# Patient Record
Sex: FEMALE | Race: WHITE | URBAN - METROPOLITAN AREA
[De-identification: names, ages, dates, MRNs, and addresses within clinical notes are randomized per-mention and may not be internally consistent; named-entity substitution may affect disease eponyms.]

---

## 2023-08-31 ENCOUNTER — APPOINTMENT (RX ONLY)
Dept: URBAN - METROPOLITAN AREA CLINIC 341 | Facility: CLINIC | Age: 58
Setting detail: DERMATOLOGY
End: 2023-08-31

## 2023-08-31 DIAGNOSIS — L71.8 OTHER ROSACEA: ICD-10-CM | Status: INADEQUATELY CONTROLLED

## 2023-08-31 PROCEDURE — ? TREATMENT REGIMEN

## 2023-08-31 PROCEDURE — ? ADDITIONAL NOTES

## 2023-08-31 PROCEDURE — ? PRESCRIPTION MEDICATION MANAGEMENT

## 2023-08-31 PROCEDURE — ? PRESCRIPTION

## 2023-08-31 PROCEDURE — ? COUNSELING

## 2023-08-31 PROCEDURE — 99204 OFFICE O/P NEW MOD 45 MIN: CPT

## 2023-08-31 RX ORDER — DOXYCYCLINE HYCLATE 20 MG/1
TABLET, FILM COATED ORAL
Qty: 60 | Refills: 1 | Status: ERX | COMMUNITY
Start: 2023-08-31

## 2023-08-31 RX ADMIN — DOXYCYCLINE HYCLATE: 20 TABLET, FILM COATED ORAL at 00:00

## 2023-08-31 ASSESSMENT — LOCATION ZONE DERM
LOCATION ZONE: EYELID
LOCATION ZONE: FACE
LOCATION ZONE: EYELID

## 2023-08-31 ASSESSMENT — LOCATION SIMPLE DESCRIPTION DERM
LOCATION SIMPLE: RIGHT CHEEK
LOCATION SIMPLE: RIGHT SUPERIOR EYELID
LOCATION SIMPLE: LEFT CHEEK
LOCATION SIMPLE: RIGHT SUPERIOR EYELID

## 2023-08-31 ASSESSMENT — LOCATION DETAILED DESCRIPTION DERM
LOCATION DETAILED: LEFT INFERIOR CENTRAL MALAR CHEEK
LOCATION DETAILED: RIGHT INFERIOR CENTRAL MALAR CHEEK
LOCATION DETAILED: RIGHT LATERAL SUPERIOR EYELID
LOCATION DETAILED: RIGHT LATERAL SUPERIOR EYELID

## 2023-08-31 NOTE — PROCEDURE: PRESCRIPTION MEDICATION MANAGEMENT
Initiate Treatment: doxycycline hyclate 20 mg tablet - Take 1 tablet PO twice daily - one in morning and one in night - with food.
Detail Level: Zone
Render In Strict Bullet Format?: No

## 2023-08-31 NOTE — PROCEDURE: ADDITIONAL NOTES
Additional Notes: Patient has been seen by eye doc for presumed conjunctivitis after eye infection following URI. Patient was on eye abx and oral doxy and improved but then case recurred. Today seems like possible occular rosacea given irritation and grittiness with red lid line. However, pt states has family history of macular degeneration recommended follow up with [facility] if not improved with low dose doxycycline. Additional Notes: Patient has been seen by eye doc for presumed conjunctivitis after eye infection following URI. Patient was on eye abx and oral doxy and improved but then case recurred. Today seems like possible occular rosacea given irritation and grittiness with red lid line. However, pt states has family history of macular degeneration recommended follow up with [facility] if not improved with low dose doxycycline.

## 2024-03-22 ENCOUNTER — NEW PATIENT (OUTPATIENT)
Dept: URBAN - METROPOLITAN AREA CLINIC 22 | Facility: CLINIC | Age: 59
End: 2024-03-22

## 2024-03-22 DIAGNOSIS — H25.13: ICD-10-CM

## 2024-03-22 DIAGNOSIS — H01.023: ICD-10-CM

## 2024-03-22 DIAGNOSIS — H04.123: ICD-10-CM

## 2024-03-22 DIAGNOSIS — H02.886: ICD-10-CM

## 2024-03-22 DIAGNOSIS — H02.883: ICD-10-CM

## 2024-03-22 DIAGNOSIS — L71.9: ICD-10-CM

## 2024-03-22 DIAGNOSIS — E11.9: ICD-10-CM

## 2024-03-22 DIAGNOSIS — H01.026: ICD-10-CM

## 2024-03-22 PROCEDURE — 92004 COMPRE OPH EXAM NEW PT 1/>: CPT

## 2024-03-22 ASSESSMENT — TONOMETRY
OS_IOP_MMHG: 14
OD_IOP_MMHG: 14

## 2024-03-22 ASSESSMENT — VISUAL ACUITY
OS_CC: 20/20
OD_CC: 20/20

## 2024-04-02 ENCOUNTER — FOLLOW UP (OUTPATIENT)
Dept: URBAN - METROPOLITAN AREA CLINIC 22 | Facility: CLINIC | Age: 59
End: 2024-04-02

## 2024-04-02 DIAGNOSIS — H01.023: ICD-10-CM

## 2024-04-02 DIAGNOSIS — H01.026: ICD-10-CM

## 2024-04-02 DIAGNOSIS — L71.9: ICD-10-CM

## 2024-04-02 DIAGNOSIS — B88.0: ICD-10-CM

## 2024-04-02 DIAGNOSIS — H02.883: ICD-10-CM

## 2024-04-02 DIAGNOSIS — H02.886: ICD-10-CM

## 2024-04-02 DIAGNOSIS — H04.123: ICD-10-CM

## 2024-04-02 PROCEDURE — 99213 OFFICE O/P EST LOW 20 MIN: CPT

## 2024-04-02 ASSESSMENT — TONOMETRY
OD_IOP_MMHG: 12
OS_IOP_MMHG: 14

## 2024-04-02 ASSESSMENT — VISUAL ACUITY
OS_CC: 20/20
OD_CC: 20/20

## 2024-05-14 ENCOUNTER — FOLLOW UP (OUTPATIENT)
Dept: URBAN - METROPOLITAN AREA CLINIC 22 | Facility: CLINIC | Age: 59
End: 2024-05-14

## 2024-05-14 DIAGNOSIS — H02.886: ICD-10-CM

## 2024-05-14 DIAGNOSIS — L71.9: ICD-10-CM

## 2024-05-14 DIAGNOSIS — H02.883: ICD-10-CM

## 2024-05-14 DIAGNOSIS — H01.026: ICD-10-CM

## 2024-05-14 DIAGNOSIS — H01.023: ICD-10-CM

## 2024-05-14 DIAGNOSIS — B88.0: ICD-10-CM

## 2024-05-14 DIAGNOSIS — H04.123: ICD-10-CM

## 2024-05-14 PROCEDURE — 92012 INTRM OPH EXAM EST PATIENT: CPT

## 2024-05-14 ASSESSMENT — VISUAL ACUITY
OS_CC: 20/20-2
OD_CC: 20/20

## 2024-05-14 ASSESSMENT — TONOMETRY
OD_IOP_MMHG: 12
OS_IOP_MMHG: 12

## (undated) RX ORDER — LOTEPREDNOL ETABONATE 5 MG/ML: 1 SUSPENSION/ DROPS OPHTHALMIC

## (undated) RX ORDER — LOTILANER OPHTHALMIC SOLUTION 2.5 MG/ML: 1 SOLUTION/ DROPS OPHTHALMIC TWICE A DAY